# Patient Record
Sex: MALE | Race: OTHER | HISPANIC OR LATINO | Employment: FULL TIME | URBAN - METROPOLITAN AREA
[De-identification: names, ages, dates, MRNs, and addresses within clinical notes are randomized per-mention and may not be internally consistent; named-entity substitution may affect disease eponyms.]

---

## 2019-11-02 ENCOUNTER — HOSPITAL ENCOUNTER (EMERGENCY)
Facility: HOSPITAL | Age: 29
Discharge: HOME | End: 2019-11-02
Attending: EMERGENCY MEDICINE

## 2019-11-02 VITALS
WEIGHT: 142.2 LBS | OXYGEN SATURATION: 100 % | RESPIRATION RATE: 16 BRPM | HEART RATE: 102 BPM | TEMPERATURE: 99 F | SYSTOLIC BLOOD PRESSURE: 118 MMHG | DIASTOLIC BLOOD PRESSURE: 68 MMHG

## 2019-11-02 DIAGNOSIS — R51.9 HEADACHE: ICD-10-CM

## 2019-11-02 DIAGNOSIS — H92.01 CHRONIC RIGHT EAR PAIN: Primary | ICD-10-CM

## 2019-11-02 DIAGNOSIS — G89.29 CHRONIC RIGHT EAR PAIN: Primary | ICD-10-CM

## 2019-11-02 PROCEDURE — 99282 EMERGENCY DEPT VISIT SF MDM: CPT | Performed by: NURSE PRACTITIONER

## 2019-11-02 PROCEDURE — 99282 EMERGENCY DEPT VISIT SF MDM: CPT

## 2019-11-02 NOTE — ED PROVIDER NOTES
History  Chief Complaint   Patient presents with   Nury Peeling     right ear pain "as long as i can remember"      This is a 30year old male who states has had chronic ear pain as long as he can remember  He states that sometimes he thinks he has "liquid from the ear"  He denies fevers  He states he has intermittent headaches  He has never had anyone examine his ear  He states he takes ibuprofen and tylenol at times with some relief  History provided by:  Patient and medical records   used: No    Earache   Location:  Right  Behind ear:  No abnormality  Quality:  Dull  Onset quality:  Gradual  Chronicity:  Chronic  Relieved by:  OTC medications  Associated symptoms: ear discharge and headaches        None       Past Medical History:   Diagnosis Date    No known health problems        Past Surgical History:   Procedure Laterality Date    NO PAST SURGERIES         History reviewed  No pertinent family history  I have reviewed and agree with the history as documented  Social History     Tobacco Use    Smoking status: Current Every Day Smoker    Smokeless tobacco: Never Used   Substance Use Topics    Alcohol use: Never     Frequency: Never    Drug use: Never        Review of Systems   HENT: Positive for ear discharge and ear pain  Neurological: Positive for headaches  All other systems reviewed and are negative  Physical Exam  Physical Exam   Constitutional: He is oriented to person, place, and time  He appears well-developed and well-nourished  No distress  HENT:   Head: Normocephalic and atraumatic  Right Ear: External ear normal    Left Ear: External ear normal    Nose: Nose normal    Mouth/Throat: Oropharynx is clear and moist  No oropharyngeal exudate  Right ear appears to have what looks like a small 0 2mm opening at the 9 o'clock position of the TM  There is a yellow elongated growth from the 1 o'clock position to the 7 o'clock position  No drainage noted  No pain with otoscope insertion     Left TM pearly grey    Eyes: Pupils are equal, round, and reactive to light  EOM are normal    Neck: Normal range of motion  Neck supple  Musculoskeletal: Normal range of motion  Neurological: He is alert and oriented to person, place, and time  Skin: Skin is warm and dry  Capillary refill takes less than 2 seconds  He is not diaphoretic  Psychiatric: He has a normal mood and affect  His behavior is normal  Judgment and thought content normal    Nursing note and vitals reviewed  Vital Signs  ED Triage Vitals [11/02/19 1354]   Temperature Pulse Respirations Blood Pressure SpO2   99 °F (37 2 °C) 102 16 118/68 100 %      Temp Source Heart Rate Source Patient Position - Orthostatic VS BP Location FiO2 (%)   Temporal -- Sitting Right arm --      Pain Score       8           Vitals:    11/02/19 1354   BP: 118/68   Pulse: 102   Patient Position - Orthostatic VS: Sitting         Visual Acuity      ED Medications  Medications - No data to display    Diagnostic Studies  Results Reviewed     None                 No orders to display              Procedures  Procedures       ED Course                               MDM  Number of Diagnoses or Management Options  Chronic right ear pain:   Headache:   Diagnosis management comments: Right ear pain  ? acustic neuroma      Plan  Refer to ENT for evaluation  Tylenol and motrin for pain  Pt agrees with POC    Pt verbalizes understanding of dc instructions and follow up        Amount and/or Complexity of Data Reviewed  Review and summarize past medical records: yes  Discuss the patient with other providers: yes (Dr Angie Ambrose )        Disposition  Final diagnoses:   Chronic right ear pain   Headache     Time reflects when diagnosis was documented in both MDM as applicable and the Disposition within this note     Time User Action Codes Description Comment    11/2/2019  2:06 PM Asya Rodriguez [H92 01,  G89 29] Chronic right ear pain 11/2/2019  2:07 PM Leona Lara Add [R51] Headache       ED Disposition     ED Disposition Condition Date/Time Comment    Discharge Stable Sat Nov 2, 2019  2:06 PM Lucius Stiles discharge to home/self care  Follow-up Information     Follow up With Specialties Details Why Contact Info Additional [address] [facility] Emergency Department Emergency Medicine  If symptoms worsen [facility] [id number] [phone] [place] ED, [address] [facility]  , [place], [place], [place]  Jorge [address], DO Otolaryngology Schedule an appointment as soon as possible for a visit in 2 days  [address] [facility]  [address] [facility] [place]       Baldo Dee MD Otolaryngology Schedule an appointment as soon as possible for a visit in 2 days  [address] [place] [address]  Family Medicine Schedule an appointment as soon as possible for a visit in 2 days call if you need a PCP [address] [facility] [facility] [address]  [address] [place] [address],[address] [facility]  [facility], [place], [place], [address]          Patient's Medications    No medications on file     No discharge procedures on file      ED Provider  Electronically Signed by           Regina Donato  11/02/19 0297

## 2020-12-09 ENCOUNTER — HOSPITAL ENCOUNTER (EMERGENCY)
Facility: HOSPITAL | Age: 30
Discharge: HOME | End: 2020-12-09
Attending: EMERGENCY MEDICINE

## 2020-12-09 VITALS
RESPIRATION RATE: 18 BRPM | HEART RATE: 84 BPM | DIASTOLIC BLOOD PRESSURE: 86 MMHG | WEIGHT: 130 LBS | OXYGEN SATURATION: 99 % | SYSTOLIC BLOOD PRESSURE: 150 MMHG | HEIGHT: 65 IN | BODY MASS INDEX: 21.66 KG/M2

## 2020-12-09 DIAGNOSIS — S61.012A LACERATION OF LEFT THUMB WITHOUT FOREIGN BODY WITHOUT DAMAGE TO NAIL, INITIAL ENCOUNTER: Primary | ICD-10-CM

## 2020-12-09 PROCEDURE — 99282 EMERGENCY DEPT VISIT SF MDM: CPT

## 2020-12-09 PROCEDURE — 99282 EMERGENCY DEPT VISIT SF MDM: CPT | Performed by: EMERGENCY MEDICINE

## 2020-12-09 PROCEDURE — 12001 RPR S/N/AX/GEN/TRNK 2.5CM/<: CPT | Performed by: EMERGENCY MEDICINE

## 2021-04-22 ENCOUNTER — HOSPITAL ENCOUNTER (EMERGENCY)
Facility: HOSPITAL | Age: 31
Discharge: HOME | End: 2021-04-22
Attending: EMERGENCY MEDICINE | Admitting: EMERGENCY MEDICINE

## 2021-04-22 ENCOUNTER — APPOINTMENT (EMERGENCY)
Dept: RADIOLOGY | Facility: HOSPITAL | Age: 31
End: 2021-04-22

## 2021-04-22 VITALS
DIASTOLIC BLOOD PRESSURE: 67 MMHG | SYSTOLIC BLOOD PRESSURE: 148 MMHG | WEIGHT: 138.89 LBS | BODY MASS INDEX: 23.11 KG/M2 | OXYGEN SATURATION: 99 % | RESPIRATION RATE: 16 BRPM | HEART RATE: 107 BPM | TEMPERATURE: 98 F

## 2021-04-22 DIAGNOSIS — M79.641 RIGHT HAND PAIN: Primary | ICD-10-CM

## 2021-04-22 PROCEDURE — 73130 X-RAY EXAM OF HAND: CPT

## 2021-04-22 PROCEDURE — 99283 EMERGENCY DEPT VISIT LOW MDM: CPT

## 2021-04-22 PROCEDURE — 99283 EMERGENCY DEPT VISIT LOW MDM: CPT | Performed by: PHYSICIAN ASSISTANT

## 2021-04-22 NOTE — DISCHARGE INSTRUCTIONS
Return to the ER with any new or worsening of symptoms such as but not limited to increased pain, increased swelling, fevers, redness or any other concerning symptoms    Thank you for allowing us to be part of your care today

## 2021-04-22 NOTE — ED PROVIDER NOTES
History  Chief Complaint   Patient presents with    Hand Pain     Right hand pain "from time to time" onset 8 months ago during work injury unsure exactly how he injured it initially  Denies swelling, numbness  Patient presents to the ER for evaluation of right hand pain  Patient states that he has had pain in his right hand, mainly in his right 2nd, 3rd, and 4th fingers for the past 8 months  Patient states that he does not remember any specific injury however states that he always injured his hands at work  States that he is a   Patient states that the pain is worse with movement  States that he occasionally feels that his finger is catching and it takes more effort to bend his finger than normal   Patient denies taking any medication for pain  Denies any numbness, tingling, weakness, or any other concerning symptoms  Patient states that he is right-hand dominant  None       Past Medical History:   Diagnosis Date    No known health problems        Past Surgical History:   Procedure Laterality Date    NO PAST SURGERIES         History reviewed  No pertinent family history  I have reviewed and agree with the history as documented  E-Cigarette/Vaping    E-Cigarette Use Never User      E-Cigarette/Vaping Substances     Social History     Tobacco Use    Smoking status: Current Every Day Smoker     Packs/day: 1 00     Types: Cigarettes    Smokeless tobacco: Never Used   Substance Use Topics    Alcohol use: Never     Frequency: Never    Drug use: Yes     Types: Marijuana       Review of Systems   Constitutional: Negative for chills and fever  HENT: Negative for congestion and sore throat  Respiratory: Negative for cough and shortness of breath  Cardiovascular: Negative for chest pain  Gastrointestinal: Negative for abdominal pain, diarrhea, nausea and vomiting  Genitourinary: Negative for dysuria  Musculoskeletal: Negative for back pain     Skin: Negative for rash    Neurological: Negative for headaches  All other systems reviewed and are negative  Physical Exam  Physical Exam  Constitutional:       General: He is not in acute distress  Appearance: He is well-developed  HENT:      Head: Normocephalic and atraumatic  Nose: Nose normal    Eyes:      Conjunctiva/sclera: Conjunctivae normal    Neck:      Musculoskeletal: Normal range of motion  Cardiovascular:      Rate and Rhythm: Normal rate  Pulmonary:      Effort: Pulmonary effort is normal    Musculoskeletal:      Comments: Mild tenderness palpation of right middle finger  No significant swelling of right fingers or hand  Normal range of motion fingers at MCP, PIP, and PIP joints  Normal  strength bilaterally  Normal range of motion of right wrist    Skin:     General: Skin is warm  Capillary Refill: Capillary refill takes less than 2 seconds  Comments: No wounds  No redness or warmth of finger  No lymphangitis   Neurological:      Mental Status: He is alert and oriented to person, place, and time  Vital Signs  ED Triage Vitals [04/22/21 1052]   Temperature Pulse Respirations Blood Pressure SpO2   98 °F (36 7 °C) (!) 107 16 148/67 99 %      Temp Source Heart Rate Source Patient Position - Orthostatic VS BP Location FiO2 (%)   Oral -- -- -- --      Pain Score       4           Vitals:    04/22/21 1052   BP: 148/67   Pulse: (!) 107         Visual Acuity      ED Medications  Medications - No data to display    Diagnostic Studies  Results Reviewed     None                 XR hand 3+ views RIGHT   ED Interpretation by Rey Sampson PA-C (04/22 1129)   No acute abnormality   Possible old avulsion fracture to proximal middle third phalange                 Procedures  Procedures         ED Course  ED Course as of Apr 22 1131   Thu Apr 22, 2021   1126 Blood Pressure: 148/67   1127 Temperature: 98 °F (36 7 °C)   1127 Pulse(!): 107   1127 Respirations: 16   1127 SpO2: 99 % SBIRT 22yo+      Most Recent Value   SBIRT (24 yo +)   In order to provide better care to our patients, we are screening all of our patients for alcohol and drug use  Would it be okay to ask you these screening questions? No Filed at: 04/22/2021 1104                    MDM     Wet read of xray negative for acute abnormality  Possible old avulsion fracture of proximal middle third phalange  Reviewed results with patient  Discussed symptomatic treatment and follow up with the hand specialist if symptoms persist  Strict return instructions given  Patient in no acute distress throughout ER stay  Vitals stable and reassuring  Patient stable for discharge at this time  Reviewed plan with patient/family  Reviewed red flag symptoms and strict return instructions  Patient/family voiced understanding and agreement to plan  Patient/family had opportunity to ask questions and all questions were answered at bedside  Disposition  Final diagnoses:   Right hand pain     Time reflects when diagnosis was documented in both MDM as applicable and the Disposition within this note     Time User Action Codes Description Comment    4/22/2021 11:31 AM Tee Davis Add [Y74 [id number]] Right hand pain       ED Disposition     ED Disposition Condition Date/Time Comment    Discharge Stable Thu Apr 22, 2021 11:31 AM Guerda Hoover discharge to home/self care              Follow-up Information     Follow up With Specialties Details Why Contact Info Additional [address] [facility] Emergency Department Emergency Medicine  If symptoms worsen [facility] [id number] 096 [facility] Emergency Department, [address] Richi Lincoln MD Orthopedic Surgery, Hand Surgery  Follow up with the hand specialist [address]  [address]  [phone]             Patient's Medications    No medications on file     No discharge procedures on file      PDMP Review     None          ED Provider  Electronically Signed by           Janay Flynn PA-C  04/22/21 6839